# Patient Record
Sex: FEMALE | Race: WHITE | Employment: STUDENT
[De-identification: names, ages, dates, MRNs, and addresses within clinical notes are randomized per-mention and may not be internally consistent; named-entity substitution may affect disease eponyms.]

---

## 2017-12-17 ENCOUNTER — HEALTH MAINTENANCE LETTER (OUTPATIENT)
Age: 22
End: 2017-12-17

## 2018-01-04 ENCOUNTER — RADIANT APPOINTMENT (OUTPATIENT)
Dept: ULTRASOUND IMAGING | Facility: CLINIC | Age: 23
End: 2018-01-04
Attending: NURSE PRACTITIONER
Payer: COMMERCIAL

## 2018-01-04 DIAGNOSIS — Z30.431 IUD CHECK UP: ICD-10-CM

## 2018-01-16 ENCOUNTER — TRANSFERRED RECORDS (OUTPATIENT)
Dept: HEALTH INFORMATION MANAGEMENT | Facility: CLINIC | Age: 23
End: 2018-01-16

## 2018-01-16 ENCOUNTER — MEDICAL CORRESPONDENCE (OUTPATIENT)
Dept: CARDIOLOGY | Facility: CLINIC | Age: 23
End: 2018-01-16

## 2018-01-16 DIAGNOSIS — R00.2 HEART PALPITATIONS: Primary | ICD-10-CM

## 2018-01-23 ENCOUNTER — ALLIED HEALTH/NURSE VISIT (OUTPATIENT)
Dept: CARDIOLOGY | Facility: CLINIC | Age: 23
End: 2018-01-23
Attending: INTERNAL MEDICINE
Payer: COMMERCIAL

## 2018-01-23 DIAGNOSIS — R00.2 HEART PALPITATIONS: ICD-10-CM

## 2018-01-23 PROCEDURE — 0298T ZZC EXT ECG > 48HR TO 21 DAY REVIEW AND INTERPRETATN: CPT | Performed by: INTERNAL MEDICINE

## 2018-01-23 PROCEDURE — 0296T ZIO PATCH HOLTER: CPT | Mod: ZF

## 2018-01-23 NOTE — NURSING NOTE
Per VERA Mosquera, patient to have 7 day Zio monitor placed.  Diagnosis: palpitations  Monitor placed: Yes  Patient Instructed: Yes  Patient verbalized understanding: Yes  Holter # [id number]  Placed by ALLISON Mejia

## 2018-01-23 NOTE — MR AVS SNAPSHOT
After Visit Summary   1/23/2018    Idalia Tian    MRN: [id number]           Patient Information     Date Of Birth          1995        Visit Information        Provider Department      1/23/2018 3:00 PM Tech, Uc Cvc Monitor, [facility]        Today's Diagnoses     Heart palpitations           Follow-ups after your visit        Your next 10 appointments already scheduled     Feb 19, 2018  9:45 AM CST   US PELVIC COMPLETE W TRANSVAGINAL with UCUS2   [facility] ([facility])    [address] [place] [address] [phone]           Please bring a list of your medicines (including vitamins, minerals and over-the-counter drugs). Also, tell your doctor about any allergies you may have. Wear comfortable clothes and leave your valuables at home.  Adults: Drink six 8-ounce glasses of fluid one hour before your exam. Do NOT empty your bladder.  If you need to empty your bladder before your exam, try to release only a little bit of urine. Then, drink another 8oz glass of fluid.  Children: Children who are potty trained should drink at least 4 cups (32 oz) of liquid 45 minutes to one hour prior to the exam. The child s bladder must be full in order to achieve a diagnostic exam. If your child is very uncomfortable or has an urgent need to pee, please notify a technologist; they will try to find out how much longer the wait may be and provide instructions to help relieve the pressure. Occasionally it is medically necessary to insert a urinary catheter to fill the bladder.  Please call the Imaging Department at your exam site with any questions.              Who to contact     If you have questions or need follow up information about today's clinic visit or your schedule please contact [facility] directly at [phone].  Normal or non-critical lab and imaging results will be communicated to you by MyChart, letter or  phone within 4 business days after the clinic has received the results. If you do not hear from us within 7 days, please contact the clinic through [employer] or phone. If you have a critical or abnormal lab result, we will notify you by phone as soon as possible.  Submit refill requests through [employer] or call your pharmacy and they will forward the refill request to us. Please allow 3 business days for your refill to be completed.          Additional Information About Your Visit        [employer] Information     [employer] gives you secure access to your electronic health record. If you see a primary care provider, you can also send messages to your care team and make appointments. If you have questions, please call your primary care clinic.  If you do not have a primary care provider, please call [phone] and they will assist you.        Care EveryWhere ID     This is your Care EveryWhere ID. This could be used by other organizations to access your [facility] medical records  [id number]         Blood Pressure from Last 3 Encounters:   07/20/16 122/74   03/16/16 110/66   09/19/14 110/60    Weight from Last 3 Encounters:   07/20/16 72.8 kg (160 lb 8 oz) (87 %)*   03/16/16 72.6 kg (160 lb 1.6 oz) (88 %)*   09/19/14 73.9 kg (163 lb) (91 %)*     * Growth percentiles are based on [facility] 2-20 Years data.              We Performed the Following     Zio Patch Holter        Primary Care Provider Office Phone # Fax #    Missy Amy Martel [phone]       [address] [place] [address]        Equal Access to Services     [facility]: Hadii aad ku hadasho Soomaali, waaxda luqadaha, qaybta kaalmada bashir, hubert de guzman . So [facility] [phone].    ATENCIÓN: Si habla español, tiene a schaefer disposición servicios gratuitos de asistencia lingüística. Llame al [phone].    We comply with applicable federal civil rights laws and [place] laws. We do not discriminate on the basis  of race, color, national origin, age, disability, sex, sexual orientation, or gender identity.            Thank you!     Thank you for choosing [facility]  for your care. Our goal is always to provide you with excellent care. Hearing back from our patients is one way we can continue to improve our services. Please take a few minutes to complete the written survey that you may receive in the mail after your visit with us. Thank you!             Your Updated Medication List - Protect others around you: Learn how to safely use, store and throw away your medicines at www.disposemymeds.org.          This list is accurate as of: 1/23/18  5:01 PM.  Always use your most recent med list.                   Brand Name Dispense Instructions for use Diagnosis    fluocinolone 0.01 % solution    SYNALAR    60 mL    Apply nightly for 1 week, then take 1 week off, repeat cycle as needed    Alopecia areata       IBUPROFEN           levonorgestrel-ethinyl estradiol 0.1-20 MG-MCG per tablet    AVIANE,ALESSE,LESSINA    84 tablet    Take 1 tablet by mouth daily    Encounter for initial prescription of contraceptive pills       pseudoePHEDrine 30 MG tablet    SUDAFED    112 tablet    Take 1 tablet (30 mg) by mouth every 4 hours as needed for congestion    Congestion of paranasal sinus

## 2018-01-24 ENCOUNTER — TRANSFERRED RECORDS (OUTPATIENT)
Dept: HEALTH INFORMATION MANAGEMENT | Facility: CLINIC | Age: 23
End: 2018-01-24

## 2018-01-25 ENCOUNTER — RADIANT APPOINTMENT (OUTPATIENT)
Dept: ULTRASOUND IMAGING | Facility: CLINIC | Age: 23
End: 2018-01-25
Attending: NURSE PRACTITIONER
Payer: COMMERCIAL

## 2018-01-25 ENCOUNTER — MEDICAL CORRESPONDENCE (OUTPATIENT)
Dept: HEALTH INFORMATION MANAGEMENT | Facility: CLINIC | Age: 23
End: 2018-01-25

## 2018-01-25 ENCOUNTER — TRANSFERRED RECORDS (OUTPATIENT)
Dept: HEALTH INFORMATION MANAGEMENT | Facility: CLINIC | Age: 23
End: 2018-01-25

## 2018-01-25 DIAGNOSIS — N94.89 UTERINE CRAMPING: ICD-10-CM

## 2018-01-30 ENCOUNTER — OFFICE VISIT (OUTPATIENT)
Dept: OBGYN | Facility: CLINIC | Age: 23
End: 2018-01-30
Attending: OBSTETRICS & GYNECOLOGY
Payer: COMMERCIAL

## 2018-01-30 VITALS
SYSTOLIC BLOOD PRESSURE: 122 MMHG | WEIGHT: 165.1 LBS | HEIGHT: 68 IN | DIASTOLIC BLOOD PRESSURE: 68 MMHG | HEART RATE: 70 BPM | BODY MASS INDEX: 25.02 KG/M2

## 2018-01-30 DIAGNOSIS — Z30.432 ENCOUNTER FOR IUD REMOVAL: Primary | ICD-10-CM

## 2018-01-30 NOTE — LETTER
"1/30/2018       RE: Idalia Tian  [address] [facility] [address]     Dear Colleague,    Thank you for referring your patient, Idalia Tian, to the [facility] at [facility]. Please see a copy of my visit note below.    Progress Note    SUBJECTIVE:  Idalia Tian is a 22 year old G0  here today for USG guided IUD removal. She reports having pelvic cramping which led to USG showing IUD in lower uterine segment and attempted IUD removal at [place], but this was unfortunately unsuccessful. She is very anxious today, tearful, but without any other c/o.       MEDICAL HISTORY:    Current Outpatient Prescriptions on File Prior to Visit:  IBUPROFEN      No current facility-administered medications on file prior to visit.        No Known Allergies    Obstetric History     No data available     Past Medical History:   Diagnosis Date     Eczema     hands      NO ACTIVE PROBLEMS      Past Surgical History:   Procedure Laterality Date     wisdom teeth       Family History   Problem Relation Age of Onset     Asthma Father      work related     DIABETES Father      type 2     Social History     Social History     Marital status: Single     Spouse name: N/A     Number of children: N/A     Years of education: N/A     Social History Main Topics     Smoking status: Passive Smoke Exposure - Never Smoker     Smokeless tobacco: Never Used      Comment: outside     Alcohol use No     Drug use: No     Sexual activity: No     Other Topics Concern     None     Social History Narrative       ROS   ROS: 10 point ROS neg other than the symptoms noted above in the HPI.      PHYSICAL EXAM:  Blood pressure 122/68, pulse 70, height 1.727 m (5' 8\"), weight 74.9 kg (165 lb 1.6 oz), not currently breastfeeding. Body mass index is 25.1 kg/(m^2).  General - Well-nourished, pleasant female in no acute distress    Pelvic Exam:  EG/BUS: Normal genital architecture without lesions, erythema or " abnormal secretions Bartholin's, Urethra, [facility] normal   Vagina: moist, pink, rugae with creamy, white and odorless secretions  Cervix: pink, moist, closed, without lesion. No IUD strings visualized.       IUD Removal Procedure Note  1/30/2018    Abdominal/pelvic USG was performed at bedside revealing IUD in lower uterine segment.     Latasha speculum was inserted into the vagina and cervix was prepped with Betadine. Anterior lip of cervix was then grasped with single-toothed tenaculum and Paracervical block was performed using 1% Lidocaine without EPI in radial pattern: 10 mL total injected in equal amounts at 2, 4, 8 and 10 o'clock.    IUD hook was then inserted through cervical os and up to uterine fundus under USG guidance. IUD was successfully grasped with IUD hook and removed.     All instruments were then removed. Tenaculum sites were hemostatic with pressure.     Patient tolerated the procedure with moderate difficulty.       ASSESSMENT:  Malpositioned IUD    PLAN:   Successful removal of IUD in clinic today under USG guidance. Patient to use condoms for contraception at this time.  F/u prn      Again, thank you for allowing me to participate in the care of your patient.      Sincerely,    Blanca Alvarado MD

## 2018-01-30 NOTE — PROGRESS NOTES
"Progress Note    SUBJECTIVE:  Idalia Tian is a 22 year old G0  here today for USG guided IUD removal. She reports having pelvic cramping which led to USG showing IUD in lower uterine segment and attempted IUD removal at [place], but this was unfortunately unsuccessful. She is very anxious today, tearful, but without any other c/o.       MEDICAL HISTORY:    Current Outpatient Prescriptions on File Prior to Visit:  IBUPROFEN      No current facility-administered medications on file prior to visit.        No Known Allergies    Obstetric History     No data available     Past Medical History:   Diagnosis Date     Eczema     hands      NO ACTIVE PROBLEMS      Past Surgical History:   Procedure Laterality Date     wisdom teeth       Family History   Problem Relation Age of Onset     Asthma Father      work related     DIABETES Father      type 2     Social History     Social History     Marital status: Single     Spouse name: N/A     Number of children: N/A     Years of education: N/A     Social History Main Topics     Smoking status: Passive Smoke Exposure - Never Smoker     Smokeless tobacco: Never Used      Comment: outside     Alcohol use No     Drug use: No     Sexual activity: No     Other Topics Concern     None     Social History Narrative       ROS   ROS: 10 point ROS neg other than the symptoms noted above in the HPI.      PHYSICAL EXAM:  Blood pressure 122/68, pulse 70, height 1.727 m (5' 8\"), weight 74.9 kg (165 lb 1.6 oz), not currently breastfeeding. Body mass index is 25.1 kg/(m^2).  General - Well-nourished, pleasant female in no acute distress    Pelvic Exam:  EG/BUS: Normal genital architecture without lesions, erythema or abnormal secretions Bartholin's, Urethra, [facility] normal   Vagina: moist, pink, rugae with creamy, white and odorless secretions  Cervix: pink, moist, closed, without lesion. No IUD strings visualized.       IUD Removal Procedure Note  1/30/2018    Abdominal/pelvic USG was performed " at bedside revealing IUD in lower uterine segment.     Latasha speculum was inserted into the vagina and cervix was prepped with Betadine. Anterior lip of cervix was then grasped with single-toothed tenaculum and Paracervical block was performed using 1% Lidocaine without EPI in radial pattern: 10 mL total injected in equal amounts at 2, 4, 8 and 10 o'clock.    IUD hook was then inserted through cervical os and up to uterine fundus under USG guidance. IUD was successfully grasped with IUD hook and removed.     All instruments were then removed. Tenaculum sites were hemostatic with pressure.     Patient tolerated the procedure with moderate difficulty.       ASSESSMENT:  Malpositioned IUD    PLAN:   Successful removal of IUD in clinic today under USG guidance. Patient to use condoms for contraception at this time.  F/u cole Alvarado MD  1/30/2018 11:18 AM

## 2018-01-30 NOTE — MR AVS SNAPSHOT
After Visit Summary   1/30/2018    Idalia Tian    MRN: [id number]           Patient Information     Date Of Birth          1995        Visit Information        Provider Department      1/30/2018 10:30 AM Blanca Alvarado MD [facility]        Today's Diagnoses     Encounter for IUD removal    -  1       Follow-ups after your visit        Your next 10 appointments already scheduled     Feb 19, 2018  9:45 AM CST   US PELVIC COMPLETE W TRANSVAGINAL with UCUS2   [facility]  ([facility] [place])    [address] [place] [address] [phone]           Please bring a list of your medicines (including vitamins, minerals and over-the-counter drugs). Also, tell your doctor about any allergies you may have. Wear comfortable clothes and leave your valuables at home.  Adults: Drink six 8-ounce glasses of fluid one hour before your exam. Do NOT empty your bladder.  If you need to empty your bladder before your exam, try to release only a little bit of urine. Then, drink another 8oz glass of fluid.  Children: Children who are potty trained should drink at least 4 cups (32 oz) of liquid 45 minutes to one hour prior to the exam. The child s bladder must be full in order to achieve a diagnostic exam. If your child is very uncomfortable or has an urgent need to pee, please notify a technologist; they will try to find out how much longer the wait may be and provide instructions to help relieve the pressure. Occasionally it is medically necessary to insert a urinary catheter to fill the bladder.  Please call the Imaging Department at your exam site with any questions.              Who to contact     Please call your clinic at [phone] to:    Ask questions about your health    Make or cancel appointments    Discuss your medicines    Learn about your test results    Speak to your doctor   If you have compliments or concerns about  "an experience at your clinic, or if you wish to file a complaint, please contact [facility] Patient Relations at [phone] or email us at [email or web address]         Additional Information About Your Visit        [employer] Information     [employer] gives you secure access to your electronic health record. If you see a primary care provider, you can also send messages to your care team and make appointments. If you have questions, please call your primary care clinic.  If you do not have a primary care provider, please call [phone] and they will assist you.      [employer] is an electronic gateway that provides easy, online access to your medical records. With [employer], you can request a clinic appointment, read your test results, renew a prescription or communicate with your care team.     To access your existing account, please contact your [facility] or call [phone] for assistance.        Care EveryWhere ID     This is your Care EveryWhere ID. This could be used by other organizations to access your [facility] medical records  [id number]        Your Vitals Were     Pulse Height BMI (Body Mass Index)             70 1.727 m (5' 8\") 25.1 kg/m2          Blood Pressure from Last 3 Encounters:   01/30/18 122/68   07/20/16 122/74   03/16/16 110/66    Weight from Last 3 Encounters:   01/30/18 74.9 kg (165 lb 1.6 oz)   07/20/16 72.8 kg (160 lb 8 oz) (87 %)*   03/16/16 72.6 kg (160 lb 1.6 oz) (88 %)*     * Growth percentiles are based on CDC 2-20 Years data.              Today, you had the following     No orders found for display         Today's Medication Changes          These changes are accurate as of 1/30/18 12:47 PM.  If you have any questions, ask your nurse or doctor.               Stop taking these medicines if you haven't already. Please contact your care team if you have questions.     fluocinolone 0.01 % solution   Commonly known as:  " SYNALAR   Stopped by:  Blanca Alvarado MD           levonorgestrel-ethinyl estradiol 0.1-20 MG-MCG per tablet   Commonly known as:  HOWIE GALAVIZ LESSINA   Stopped by:  Blanca Alvarado MD           pseudoePHEDrine 30 MG tablet   Commonly known as:  SUDAFED   Stopped by:  Blanca Alvarado MD                    Primary Care Provider Office Phone # Fax [place] Amy Martel [phone]       [address] [facility] [address]        Equal Access to Services     [facility]: Hadii aad ku hadasho Soomaali, waaxda luqadaha, qaybta kaalmada adeegyada, waxglo youngin hayjosen adesonia de guzman . So [facility] [phone].    ATENCIÓN: Si habla español, tiene a schaefer disposición servicios gratuitos de asistencia lingüística. [facility] [phone].    We comply with applicable federal civil rights laws and [place] laws. We do not discriminate on the basis of race, color, national origin, age, disability, sex, sexual orientation, or gender identity.            Thank you!     Thank you for choosing [facility]  for your care. Our goal is always to provide you with excellent care. Hearing back from our patients is one way we can continue to improve our services. Please take a few minutes to complete the written survey that you may receive in the mail after your visit with us. Thank you!             Your Updated Medication List - Protect others around you: Learn how to safely use, store and throw away your medicines at www.disposemymeds.org.          This list is accurate as of 1/30/18 12:47 PM.  Always use your most recent med list.                   Brand Name Dispense Instructions for use Diagnosis    IBUPROFEN

## 2018-02-21 ENCOUNTER — TRANSFERRED RECORDS (OUTPATIENT)
Dept: HEALTH INFORMATION MANAGEMENT | Facility: CLINIC | Age: 23
End: 2018-02-21

## 2018-02-21 ENCOUNTER — MEDICAL CORRESPONDENCE (OUTPATIENT)
Dept: HEALTH INFORMATION MANAGEMENT | Facility: CLINIC | Age: 23
End: 2018-02-21

## 2018-03-06 ENCOUNTER — OFFICE VISIT (OUTPATIENT)
Dept: CARDIOLOGY | Facility: CLINIC | Age: 23
End: 2018-03-06
Attending: INTERNAL MEDICINE
Payer: COMMERCIAL

## 2018-03-06 VITALS
SYSTOLIC BLOOD PRESSURE: 122 MMHG | WEIGHT: 161.2 LBS | OXYGEN SATURATION: 97 % | HEIGHT: 68 IN | DIASTOLIC BLOOD PRESSURE: 82 MMHG | HEART RATE: 63 BPM | BODY MASS INDEX: 24.43 KG/M2

## 2018-03-06 DIAGNOSIS — I47.29 NSVT (NONSUSTAINED VENTRICULAR TACHYCARDIA) (H): Primary | ICD-10-CM

## 2018-03-06 PROCEDURE — G0463 HOSPITAL OUTPT CLINIC VISIT: HCPCS | Mod: ZF

## 2018-03-06 PROCEDURE — 99203 OFFICE O/P NEW LOW 30 MIN: CPT | Mod: ZP | Performed by: INTERNAL MEDICINE

## 2018-03-06 RX ORDER — LEVONORGESTREL AND ETHINYL ESTRADIOL 0.15-0.03
KIT ORAL
Refills: 4 | COMMUNITY
Start: 2018-02-08

## 2018-03-06 ASSESSMENT — PAIN SCALES - GENERAL: PAINLEVEL: NO PAIN (0)

## 2018-03-06 NOTE — MR AVS SNAPSHOT
After Visit Summary   3/6/2018    Idalia Tian    MRN: [id number]           Patient Information     Date Of Birth          1995        Visit Information        Provider Department      3/6/2018 3:00 PM SHARON Plata MD [facility]        Today's Diagnoses     NSVT (nonsustained ventricular tachycardia) (H)    -  1      Care Instructions    Thank you for your visit today.  Please call me with any questions or concerns.   Wesley Gomez RN  Cardiology Care Coordinator  [phone], press option 1 then option 3          Follow-ups after your visit        Additional Services     Follow-Up with Cardiologist       Please schedule a cardiac mri for the near future.  No follow up clinic visit needed.                  Your next 10 appointments already scheduled     Mar 26, 2018  2:30 PM CDT   (Arrive by 2:15 PM)   MR CARDIAC W CONTRAST W FLOW QUANT with UUMR4, UU CV MR NURSE   [facility] [place], MRI ([facility], [facility])    [address] [address] [phone]           Take your medicines as usual, unless your doctor tells you not to.   If you take Viagra, Levitra or Cialis, stop taking it 48 hours before your test.   If you take Aggrenox or dipyridamole (Persantine, Permole), stop taking it 48 hours before your test.   If you take Theophylline or Aminophylline, stop taking it 12 hours before your test.   If you are diabetic and take oral hypoglycemics, do not take them on the day of your test.  The day before your exam, drink extra fluids at least six 8-ounce glasses (unless your doctor wants you to limit your fluids).  Stop all caffeine 12 hours before the test. This includes coffee, tea, soda, chocolate and certain medicines (such as Anacin, Excedrin and NoDoz). Also avoid decaf coffee and tea, as these contain small amounts of caffeine.  Do not eat or drink for 3 hours before your exam. You may drink water and take your  morning medicines.  You may need a blood test (creatinine test) within 30 days of your exam. Follow your doctor s orders if this is arranged before your exam.  If you are very claustrophobic, please let you doctor know. You may get a sedative medicine from your doctor before you arrive. Bring the medicine to the exam. Do not take it at home. Arrive one hour early. Bring someone who can take you home after the test. Your medicine will make you sleepy. After the exam, you may not drive, take a bus or take a taxi by yourself.  The MRI machine uses a strong magnet. Please wear clothes without metal (snaps, zippers). A sweatsuit works well, or we may give you a hospital gown.  Please remove any body piercings and hair extensions before you arrive. You will remove watches, jewelry, hairpins, wallets, dentures, partial dental plates and hearing aids. You may wear contact lenses, and you may be able to wear your rings. We have a safe place to keep your personal items, but it is safer to leave them at home.  **IMPORTANT** THE INSTRUCTIONS BELOW ARE ONLY FOR THOSE PATIENTS WHO HAVE BEEN PRESCRIBED SEDATION OR GENERAL ANESTHESIA DURING THEIR MRI PROCEDURE:  IF YOUR DOCTOR PRESCRIBED ORAL SEDATION (take medicine to help you relax during your exam):   You must get the medicine from your doctor (oral medication) before you arrive. Bring the medicine to the exam. Do not take it at home. You ll be told when to take it upon arriving for your exam.   Arrive one hour early. Bring someone who can take you home after the test. Your medicine will make you sleepy. After the exam, you may not drive, take a bus or take a taxi by yourself.  IF YOUR DOCTOR PRESCRIBED IV SEDATION:   Arrive one hour early. Bring someone who can take you home after the test. Your medicine will make you sleepy. After the exam, you may not drive, take a bus or take a taxi by yourself.   No eating 6 hours before your exam. You may have clear liquids up until 4  hours before your exam. (Clear liquids include water, clear tea, black coffee and fruit juice without pulp.)  IF YOUR DOCTOR PRESCRIBED ANESTHESIA (be asleep for your exam):   Arrive 1 1/2 hours early. Bring someone who can take you home after the test. You may not drive, take a bus or take a taxi by yourself.   No eating 8 hours before your exam. You may have clear liquids up until 4 hours before your exam. (Clear liquids include water, clear tea, black coffee and fruit juice without pulp.)   You will spend four to five hours in the recovery room.  If you have any questions, please contact your Imaging Department exam site.              Future tests that were ordered for you today     Open Future Orders        Priority Expected Expires Ordered    Follow-Up with Cardiologist Routine 3/13/2018 6/11/2018 3/6/2018    MRI Cardiac w/contrast and flow Routine 3/7/2018 3/6/2019 3/6/2018            Who to contact     If you have questions or need follow up information about today's clinic visit or your schedule please contact [facility] directly at [phone].  Normal or non-critical lab and imaging results will be communicated to you by [employer], letter or phone within 4 business days after the clinic has received the results. If you do not hear from us within 7 days, please contact the clinic through [employer] or phone. If you have a critical or abnormal lab result, we will notify you by phone as soon as possible.  Submit refill requests through [employer] or call your pharmacy and they will forward the refill request to us. Please allow 3 business days for your refill to be completed.          Additional Information About Your Visit        [employer] Information     [employer] gives you secure access to your electronic health record. If you see a primary care provider, you can also send messages to your care team and make appointments. If you have questions, please call your primary care clinic.  If you do not have a  "primary care provider, please call [phone] and they will assist you.        Care EveryWhere ID     This is your Care EveryWhere ID. This could be used by other organizations to access your [facility] medical records  [id number]        Your Vitals Were     Pulse Height Pulse Oximetry BMI (Body Mass Index)          63 1.727 m (5' 8\") 97% 24.51 kg/m2         Blood Pressure from Last 3 Encounters:   03/06/18 122/82   01/30/18 122/68   07/20/16 122/74    Weight from Last 3 Encounters:   03/06/18 73.1 kg (161 lb 3.2 oz)   01/30/18 74.9 kg (165 lb 1.6 oz)   07/20/16 72.8 kg (160 lb 8 oz) (87 %)*     * Growth percentiles are based on [facility] 2-20 Years data.               Primary Care Provider Office Phone # Fax #    Missy Amy Martel [phone]       [address] [place] [address]        Equal Access to Services     [facility] HospitalFREYA : Hadii zuri ku hadasho Soomaali, waaxda luqadaha, qaybta kaalmada adeegyaangelique, hubert de guzman . So [facility] [phone].    ATENCIÓN: Si habla español, tiene a schaefer disposición servicios gratuitos de asistencia lingüística. Llame al [phone].    We comply with applicable federal civil rights laws and [place] laws. We do not discriminate on the basis of race, color, national origin, age, disability, sex, sexual orientation, or gender identity.            Thank you!     Thank you for choosing [facility]  for your care. Our goal is always to provide you with excellent care. Hearing back from our patients is one way we can continue to improve our services. Please take a few minutes to complete the written survey that you may receive in the mail after your visit with us. Thank you!             Your Updated Medication List - Protect others around you: Learn how to safely use, store and throw away your medicines at www.disposemymeds.org.          This list is accurate as of 3/6/18  3:18 PM.  Always use your most recent med list.                "    Brand Name Dispense Instructions for use Diagnosis    ALTAVERA 0.15-30 MG-MCG per tablet   Generic drug:  levonorgestrel-ethinyl estradiol      TAKE 1 TABLET DAILY        IBUPROFEN PO      Take 1-2 tablets by mouth every 4 hours as needed for moderate pain

## 2018-03-06 NOTE — LETTER
3/6/2018      RE: Idalia Tian  [address] [facility] [address]       Dear Colleague,    Thank you for the opportunity to participate in the care of your patient, Idalia Tian, at the [facility]. Please see a copy of my visit note below.       SUBJECTIVE:  Idalia Tian is a 22 year old female who presents for evaluation of palpitation.  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  Had skipped beats for long time. Non progressive. No other symptoms. Recently also had chest pain  Lasting a second or so.Mostly at rest.  No excess coffee/alcohol. Non smoker.  No dizziness/LOC. No fast arrhythmia.    Patient Active Problem List    Diagnosis Date Noted     NSVT (nonsustained ventricular tachycardia) (H) 03/06/2018     Priority: Medium     Alopecia areata 09/19/2014     Priority: Medium    .  Current Outpatient Prescriptions   Medication Sig     ALTAVERA 0.15-30 MG-MCG per tablet TAKE 1 TABLET DAILY     IBUPROFEN PO Take 1-2 tablets by mouth every 4 hours as needed for moderate pain     No current facility-administered medications for this visit.      Past Medical History:   Diagnosis Date     Eczema     hands      NO ACTIVE PROBLEMS      Past Surgical History:   Procedure Laterality Date     wisdom teeth       No Known Allergies  Social History     Social History     Marital status: Single     Spouse name: N/A     Number of children: N/A     Years of education: N/A     Occupational History      Child     Social History Main Topics     Smoking status: Passive Smoke Exposure - Never Smoker     Smokeless tobacco: Never Used      Comment: outside     Alcohol use No     Drug use: No     Sexual activity: No     Other Topics Concern     Not on file     Social History Narrative     Family History   Problem Relation Age of Onset     Asthma Father      work related     DIABETES Father      type 2        REVIEW OF SYSTEMS:  General: negative, fever, chills, night  "sweats  Skin: negative, acne, rash and scaling  Eyes: negative, double vision, eye pain and photophobia  Ears/Nose/Throat: negative, nasal congestion and purulent rhinorrhea  Respiratory: No dyspnea on exertion, No cough, No hemoptysis and negative  Cardiovascular: negative, tachycardia, chest pain, exertional chest pain or pressure, paroxysmal nocturnal dyspnea, dyspnea on exertion and orthopnea  Gastrointestinal: negative, dysphagia, nausea and vomiting  Genitourinary: negative, nocturia, dysuria and frequency  Musculoskeletal: negative, fracture, back pain and neck pain  Neurologic: negative, headaches, syncope, stroke and seizures  Psychiatric: negative, nervous breakdown, thoughts of self-harm and thoughts of hurting someone else  Hematologic/Lymphatic/Immunologic: negative, bleeding disorder, chills and fever  Endocrine: negative, cold intolerance, heat intolerance and hot flashes       OBJECTIVE:  Blood pressure 122/82, pulse 63, height 1.727 m (5' 8\"), weight 73.1 kg (161 lb 3.2 oz), SpO2 97 %, not currently breastfeeding.  General Appearance: alert and no distress  Head: Normocephalic. No masses, lesions, tenderness or abnormalities  Eyes: conjuctiva clear, PERRL, EOM intact  Ears: External ears normal. Canals clear. TM's normal.  Nose: Nares normal  Mouth: normal  Neck: Supple, no cervical adenopathy, no thyromegaly  Lungs: clear to auscultation  Cardiac: regular rate and rhythm, normal S1 and S2, no murmur  Abdomen: Soft, nontender.  Normal bowel sounds.  No hepatosplenomegaly or abnormal masses  Extremities: no peripheral edema, peripheral pulses normal  Musculoskeletal: negative  Neurological: Cranial nerves 2-12 intact, motor strength intact       ASSESSMENT/PLAN:  22 yr old female with skipped beats.   No symptoms.  Had this for long time.  Non progressive.  No dizziness/LOC.  No excess alcohol/coffee.  Reviewed old echo. Normal imn past.  Zio reviewed. <1% PACs/PVCs.  One 5 beat run at 150BPM rete " NSVT.  Will check a cardiac MRI to rule out structural heart disease and re-assess function.  If normal no further testing is needed.  Patient re-assured about benign nature of arrhythmia if MRI is normal.  Per orders.   Return to Clinic ONI Plata MD

## 2018-03-06 NOTE — PROGRESS NOTES
SUBJECTIVE:  Idalia Tian is a 22 year old female who presents for evaluation of palpitation.  Under grad student of Plant science at .  Had skipped beats for long time. Non progressive. No other symptoms. Recently also had chest pain  Lasting a second or so.Mostly at rest.  No excess coffee/alcohol. Non smoker.  No dizziness/LOC. No fast arrhythmia.    Patient Active Problem List    Diagnosis Date Noted     NSVT (nonsustained ventricular tachycardia) (H) 03/06/2018     Priority: Medium     Alopecia areata 09/19/2014     Priority: Medium    .  Current Outpatient Prescriptions   Medication Sig     ALTAVERA 0.15-30 MG-MCG per tablet TAKE 1 TABLET DAILY     IBUPROFEN PO Take 1-2 tablets by mouth every 4 hours as needed for moderate pain     No current facility-administered medications for this visit.      Past Medical History:   Diagnosis Date     Eczema     hands      NO ACTIVE PROBLEMS      Past Surgical History:   Procedure Laterality Date     wisdom teeth       No Known Allergies  Social History     Social History     Marital status: Single     Spouse name: N/A     Number of children: N/A     Years of education: N/A     Occupational History      Child     Social History Main Topics     Smoking status: Passive Smoke Exposure - Never Smoker     Smokeless tobacco: Never Used      Comment: outside     Alcohol use No     Drug use: No     Sexual activity: No     Other Topics Concern     Not on file     Social History Narrative     Family History   Problem Relation Age of Onset     Asthma Father      work related     DIABETES Father      type 2          REVIEW OF SYSTEMS:  General: negative, fever, chills, night sweats  Skin: negative, acne, rash and scaling  Eyes: negative, double vision, eye pain and photophobia  Ears/Nose/Throat: negative, nasal congestion and purulent rhinorrhea  Respiratory: No dyspnea on exertion, No cough, No hemoptysis and negative  Cardiovascular: negative, tachycardia, chest pain, exertional  "chest pain or pressure, paroxysmal nocturnal dyspnea, dyspnea on exertion and orthopnea  Gastrointestinal: negative, dysphagia, nausea and vomiting  Genitourinary: negative, nocturia, dysuria and frequency  Musculoskeletal: negative, fracture, back pain and neck pain  Neurologic: negative, headaches, syncope, stroke and seizures  Psychiatric: negative, nervous breakdown, thoughts of self-harm and thoughts of hurting someone else  Hematologic/Lymphatic/Immunologic: negative, bleeding disorder, chills and fever  Endocrine: negative, cold intolerance, heat intolerance and hot flashes       OBJECTIVE:  Blood pressure 122/82, pulse 63, height 1.727 m (5' 8\"), weight 73.1 kg (161 lb 3.2 oz), SpO2 97 %, not currently breastfeeding.  General Appearance: alert and no distress  Head: Normocephalic. No masses, lesions, tenderness or abnormalities  Eyes: conjuctiva clear, PERRL, EOM intact  Ears: External ears normal. Canals clear. TM's normal.  Nose: Nares normal  Mouth: normal  Neck: Supple, no cervical adenopathy, no thyromegaly  Lungs: clear to auscultation  Cardiac: regular rate and rhythm, normal S1 and S2, no murmur  Abdomen: Soft, nontender.  Normal bowel sounds.  No hepatosplenomegaly or abnormal masses  Extremities: no peripheral edema, peripheral pulses normal  Musculoskeletal: negative  Neurological: Cranial nerves 2-12 intact, motor strength intact       ASSESSMENT/PLAN:  22 yr old female with skipped beats.   No symptoms.  Had this for long time.  Non progressive.  No dizziness/LOC.  No excess alcohol/coffee.  Reviewed old echo. Normal imn past.  Zio reviewed. <1% PACs/PVCs.  One 5 beat run at 150BPM rete NSVT.  Will check a cardiac MRI to rule out structural heart disease and re-assess function.  If normal no further testing is needed.  Patient re-assured about benign nature of arrhythmia if MRI is normal.  Per orders.   Return to Clinic PRN.  "

## 2018-03-06 NOTE — PATIENT INSTRUCTIONS
Thank you for your visit today.  Please call me with any questions or concerns.   Wesley Gomez RN  Cardiology Care Coordinator  [phone], press option 1 then option 3

## 2018-03-06 NOTE — NURSING NOTE
Chief Complaint   Patient presents with     New Patient     Palpitations- zio patch on 1/23- referred by [facility]- Appt per [place]     Vitals were taken and medications were reconciled.  ALLISON Mejia  2:27 PM

## 2018-03-26 ENCOUNTER — HOSPITAL ENCOUNTER (OUTPATIENT)
Dept: MRI IMAGING | Facility: CLINIC | Age: 23
Discharge: HOME | End: 2018-03-26
Attending: INTERNAL MEDICINE | Admitting: INTERNAL MEDICINE
Payer: COMMERCIAL

## 2018-03-26 DIAGNOSIS — I47.29 NSVT (NONSUSTAINED VENTRICULAR TACHYCARDIA) (H): ICD-10-CM

## 2018-03-26 PROCEDURE — A9585 GADOBUTROL INJECTION: HCPCS | Performed by: INTERNAL MEDICINE

## 2018-03-26 PROCEDURE — 25000128 H RX IP 250 OP 636: Performed by: INTERNAL MEDICINE

## 2018-03-26 PROCEDURE — 75565 CARD MRI VELOC FLOW MAPPING: CPT | Mod: 26 | Performed by: INTERNAL MEDICINE

## 2018-03-26 PROCEDURE — 75565 CARD MRI VELOC FLOW MAPPING: CPT

## 2018-03-26 PROCEDURE — 75561 CARDIAC MRI FOR MORPH W/DYE: CPT | Mod: 26 | Performed by: INTERNAL MEDICINE

## 2018-03-26 RX ORDER — GADOBUTROL 604.72 MG/ML
10 INJECTION INTRAVENOUS ONCE
Status: COMPLETED | OUTPATIENT
Start: 2018-03-26 | End: 2018-03-26

## 2018-03-26 RX ADMIN — GADOBUTROL 10 ML: 604.72 INJECTION INTRAVENOUS at 16:09

## 2019-11-08 ENCOUNTER — HEALTH MAINTENANCE LETTER (OUTPATIENT)
Age: 24
End: 2019-11-08

## 2020-02-23 ENCOUNTER — HEALTH MAINTENANCE LETTER (OUTPATIENT)
Age: 25
End: 2020-02-23

## 2020-12-06 ENCOUNTER — HEALTH MAINTENANCE LETTER (OUTPATIENT)
Age: 25
End: 2020-12-06

## 2021-09-25 ENCOUNTER — HEALTH MAINTENANCE LETTER (OUTPATIENT)
Age: 26
End: 2021-09-25

## 2022-01-15 ENCOUNTER — HEALTH MAINTENANCE LETTER (OUTPATIENT)
Age: 27
End: 2022-01-15

## 2023-01-07 ENCOUNTER — HEALTH MAINTENANCE LETTER (OUTPATIENT)
Age: 28
End: 2023-01-07

## 2023-04-22 ENCOUNTER — HEALTH MAINTENANCE LETTER (OUTPATIENT)
Age: 28
End: 2023-04-22

## 2024-11-18 NOTE — NURSING NOTE
Cardiac Testing: Patient given instructions regarding a cardiac MRI. Discussed purpose, preparation, procedure and when to expect results reported back to the patient. Patient demonstrated understanding of this information and agreed to call with further questions or concerns.  Med Reconcile: Reviewed and verified all current medications with the patient. The updated medication list was printed and given to the patient.  Return Appointment: PRN.  Patient given instructions regarding scheduling next clinic visit. Patient demonstrated understanding of this information and agreed to call with further questions or concerns.  Patient stated she understood all health information given and agreed to call with further questions or concerns.   4 = No assist / stand by assistance